# Patient Record
(demographics unavailable — no encounter records)

---

## 2024-12-12 NOTE — ASSESSMENT
[FreeTextEntry1] : Female with hxo rectal bleed last colonoscopy showed Tubulovillous adenoma fragments   Colonoscopy with extra time   I have once again asked the patient to schedule a colonoscopy in the near future. I have reviewed the risks benefits and alternatives and provide the patient literature to read. I have emphasized the need to have a good clean out including adequate fluid intake and avoiding seeds for one week prior to the procedure.  I have discussed the role of daily high-intensity exercise with the patient.   I have also discussed nutrition in detail including consuming vegetable fibers on a daily basis and limiting simple carbohydrates 5 days per week.   Patient seen with NP, Francesca Lemos

## 2024-12-12 NOTE — PHYSICAL EXAM
[Alert] : alert [Normal Voice/Communication] : normal voice/communication [Healthy Appearing] : healthy appearing [No Acute Distress] : no acute distress [Sclera] : the sclera and conjunctiva were normal [Hearing Threshold Finger Rub Not [place]] : hearing was normal [Normal Lips/Gums] : the lips and gums were normal [Oropharynx] : the oropharynx was normal [Normal Appearance] : the appearance of the neck was normal [No Neck Mass] : no neck mass was observed [No Respiratory Distress] : no respiratory distress [No Acc Muscle Use] : no accessory muscle use [Respiration, Rhythm And Depth] : normal respiratory rhythm and effort [Auscultation Breath Sounds / Voice Sounds] : lungs were clear to auscultation bilaterally [Heart Rate And Rhythm] : heart rate was normal and rhythm regular [Normal S1, S2] : normal S1 and S2 [Murmurs] : no murmurs [Bowel Sounds] : normal bowel sounds [Abdomen Tenderness] : non-tender [No Masses] : no abdominal mass palpated [Abdomen Soft] : soft [] : no hepatosplenomegaly [Cervical Lymph Nodes Enlarged Posterior Bilaterally] : no posterior cervical lymphadenopathy [No CVA Tenderness] : no CVA  tenderness [Abnormal Walk] : normal gait [Normal Color / Pigmentation] : normal skin color and pigmentation [Motor Exam] : the motor exam was normal [Oriented To Time, Place, And Person] : oriented to person, place, and time

## 2024-12-12 NOTE — HISTORY OF PRESENT ILLNESS
[FreeTextEntry1] : Date: 2024   Colonoscopy Report  Indications:    Rectal bleedin.3 - K62.5    Procedure:    This is a average risk patient  undergoing a diagnostic colonoscopy. Prior  colonoscopy was performed over ten years ago. This colonoscopy is being  performed sooner than the recommended timeframe due to Rectal bleeding.    The procedure, indications, preparation and potential complications were  explained to the patient, who indicated understanding and signed the  corresponding consent forms. MAC was administered by the anesthesiologist.  Continuous pulse oximetry and blood pressure monitoring were used throughout the  procedure. Supplemental oxygen was used. The quality of preparation was 0-5 on  the BBP scale/inadequate. Patient was placed in left lateral decubitus position.  Digital exam was normal. The colonoscope was introduced through the rectum and  advanced under direct visualization until cecum was reached. The appendiceal  orifice and the ileocecal valve were identified. Careful visualization was  performed as the instrument was withdrawn. Retroflexion in the rectum was  performed successfully and there were grade I hemorrhoids. Patient tolerance to  procedure was excellent. The procedure was not difficult.    [place] Bowel Preparation Score:    Using the [place] Bowel Preparation Score, each segment of the colon was graded  as follows:    Left Colon: Good, 2 Transverse Colon: Good, 2  Right Colon: Poor, 1    Limitations/Complications:    There were no apparent limitations or complications    Findings:    Excavated lesions Multiple diverticula with mixed openings were seen in the  whole colon. Diverticulosis appeared to be severe.    Protruding lesions A single sessile 5 mm polyp was found in the transverse  colon.A single-piece polypectomy was performed using a cold forceps. The polyp  was completely removed.    A single sessile 2.7 mm non-bleeding polyp of multilobular appearance was found  in the hepatic flexure. Tattoo place in area.. Polyp in depression, between  fold, lifted and 70% piecmeal eradicated.A piece-meal polypectomy was performed  using a hot snare over a saline pillow. The polyp was completely removed.    Impressions:    Polyp (5 mm) in the transverse colon. (Polypectomy).    Polyp (2.7 mm) in the hepatic flexure. (Polypectomy).    Severe diverticulosis of the whole colon.    Plan:    Await pathology results.    High Fiber Diet    Colonoscopy in 6 months with clear cap/endocuff, extra time    Peterson Armas MD   [de-identified] : Date: September 23, 2024   Surgical Pathology Report - Auth (Verified)  Specimen(s) Submitted 1  Transverse colon polyp 2  Hepatic flexure polyp  Final Diagnosis 1. Transverse colon, polypectomy: -Minute tubular adenoma   2. Hepatic flexure, polypectomy: -Tubulovillous adenoma fragments Verified by: Edita López MD (Electronic Signature) Reported on: 09/25/24 11:54 EDT, [facility], [address] [place] [id number] _________________________________________________________________   Clinical Information [facility]  Gross Description 1. Received in formalin labeled   "transverse colon polyp"  is a 0.2 cm in greatest dimension tan-pink, soft tissue fragment. Entirely in one cassette: 1A.  2. Received in formalin labeled   "hepatic flexure polyp"  are multiple, tan-pink, soft tissue fragments ranging from 0.2 cm to 0.5 cm in greatest dimension. Entirely in four cassettes: 2A-2D.  Dulce Maria Merrill 09/24/2024 07:48 AM  Disclaimer In addition to other data that may appear on the specimen containers, all labels have been inspected to confirm the presence of the patient's name and date of birth.   Histological Processing Performed at [facility], Department of Pathology, [address] [place] [address].